# Patient Record
Sex: FEMALE | Race: WHITE | HISPANIC OR LATINO | Employment: OTHER | URBAN - METROPOLITAN AREA
[De-identification: names, ages, dates, MRNs, and addresses within clinical notes are randomized per-mention and may not be internally consistent; named-entity substitution may affect disease eponyms.]

---

## 2018-07-09 ENCOUNTER — OFFICE VISIT (OUTPATIENT)
Dept: INTERNAL MEDICINE | Facility: CLINIC | Age: 78
End: 2018-07-09

## 2018-07-09 ENCOUNTER — LAB VISIT (OUTPATIENT)
Dept: LAB | Facility: HOSPITAL | Age: 78
End: 2018-07-09
Attending: INTERNAL MEDICINE

## 2018-07-09 ENCOUNTER — TELEPHONE (OUTPATIENT)
Dept: INTERNAL MEDICINE | Facility: CLINIC | Age: 78
End: 2018-07-09

## 2018-07-09 ENCOUNTER — HOSPITAL ENCOUNTER (OUTPATIENT)
Dept: CARDIOLOGY | Facility: CLINIC | Age: 78
Discharge: HOME | End: 2018-07-09

## 2018-07-09 DIAGNOSIS — E03.9 HYPOTHYROIDISM, UNSPECIFIED TYPE: ICD-10-CM

## 2018-07-09 DIAGNOSIS — I10 HYPERTENSION, UNSPECIFIED TYPE: ICD-10-CM

## 2018-07-09 DIAGNOSIS — R53.83 FATIGUE, UNSPECIFIED TYPE: ICD-10-CM

## 2018-07-09 DIAGNOSIS — Z00.00 ROUTINE GENERAL MEDICAL EXAMINATION AT A HEALTH CARE FACILITY: Primary | ICD-10-CM

## 2018-07-09 DIAGNOSIS — Z00.00 ROUTINE GENERAL MEDICAL EXAMINATION AT A HEALTH CARE FACILITY: ICD-10-CM

## 2018-07-09 DIAGNOSIS — Z00.00 GENERAL MEDICAL EXAM: Primary | ICD-10-CM

## 2018-07-09 LAB
25(OH)D3+25(OH)D2 SERPL-MCNC: 38 NG/ML
ALBUMIN SERPL BCP-MCNC: 4.1 G/DL
ALP SERPL-CCNC: 147 U/L
ALT SERPL W/O P-5'-P-CCNC: 19 U/L
ANION GAP SERPL CALC-SCNC: 8 MMOL/L
AST SERPL-CCNC: 25 U/L
BASOPHILS # BLD AUTO: 0.03 K/UL
BASOPHILS NFR BLD: 0.6 %
BILIRUB SERPL-MCNC: 0.6 MG/DL
BUN SERPL-MCNC: 23 MG/DL
CALCIUM SERPL-MCNC: 10.1 MG/DL
CHLORIDE SERPL-SCNC: 102 MMOL/L
CHOLEST SERPL-MCNC: 217 MG/DL
CHOLEST/HDLC SERPL: 4.3 {RATIO}
CO2 SERPL-SCNC: 29 MMOL/L
CREAT SERPL-MCNC: 1.2 MG/DL
DIFFERENTIAL METHOD: ABNORMAL
EOSINOPHIL # BLD AUTO: 0.2 K/UL
EOSINOPHIL NFR BLD: 4 %
ERYTHROCYTE [DISTWIDTH] IN BLOOD BY AUTOMATED COUNT: 14.6 %
ERYTHROCYTE [SEDIMENTATION RATE] IN BLOOD BY WESTERGREN METHOD: 20 MM/HR
EST. GFR  (AFRICAN AMERICAN): 50.4 ML/MIN/1.73 M^2
EST. GFR  (NON AFRICAN AMERICAN): 43.7 ML/MIN/1.73 M^2
ESTIMATED AVG GLUCOSE: 117 MG/DL
GLUCOSE SERPL-MCNC: 103 MG/DL
HBA1C MFR BLD HPLC: 5.7 %
HCT VFR BLD AUTO: 41.1 %
HDLC SERPL-MCNC: 51 MG/DL
HDLC SERPL: 23.5 %
HGB BLD-MCNC: 13.1 G/DL
IMM GRANULOCYTES # BLD AUTO: 0.01 K/UL
IMM GRANULOCYTES NFR BLD AUTO: 0.2 %
LDLC SERPL CALC-MCNC: 141.8 MG/DL
LYMPHOCYTES # BLD AUTO: 1.4 K/UL
LYMPHOCYTES NFR BLD: 28.9 %
MCH RBC QN AUTO: 26.5 PG
MCHC RBC AUTO-ENTMCNC: 31.9 G/DL
MCV RBC AUTO: 83 FL
MONOCYTES # BLD AUTO: 0.4 K/UL
MONOCYTES NFR BLD: 8.3 %
NEUTROPHILS # BLD AUTO: 2.8 K/UL
NEUTROPHILS NFR BLD: 58 %
NONHDLC SERPL-MCNC: 166 MG/DL
NRBC BLD-RTO: 0 /100 WBC
PLATELET # BLD AUTO: 412 K/UL
PMV BLD AUTO: 9.4 FL
POTASSIUM SERPL-SCNC: 3.8 MMOL/L
PROT SERPL-MCNC: 8.1 G/DL
RBC # BLD AUTO: 4.95 M/UL
SODIUM SERPL-SCNC: 139 MMOL/L
T4 FREE SERPL-MCNC: 1.24 NG/DL
TRIGL SERPL-MCNC: 121 MG/DL
TSH SERPL DL<=0.005 MIU/L-ACNC: 0.13 UIU/ML
WBC # BLD AUTO: 4.81 K/UL

## 2018-07-09 PROCEDURE — 80061 LIPID PANEL: CPT

## 2018-07-09 PROCEDURE — 80053 COMPREHEN METABOLIC PANEL: CPT

## 2018-07-09 PROCEDURE — 84443 ASSAY THYROID STIM HORMONE: CPT

## 2018-07-09 PROCEDURE — 99999 PR PBB SHADOW E&M-NEW PATIENT-LVL III: CPT | Mod: PBBFAC,,, | Performed by: INTERNAL MEDICINE

## 2018-07-09 PROCEDURE — 85025 COMPLETE CBC W/AUTO DIFF WBC: CPT

## 2018-07-09 PROCEDURE — 84439 ASSAY OF FREE THYROXINE: CPT

## 2018-07-09 PROCEDURE — 36415 COLL VENOUS BLD VENIPUNCTURE: CPT

## 2018-07-09 PROCEDURE — 83036 HEMOGLOBIN GLYCOSYLATED A1C: CPT

## 2018-07-09 PROCEDURE — 93010 ELECTROCARDIOGRAM REPORT: CPT | Mod: S$PBB,,, | Performed by: INTERNAL MEDICINE

## 2018-07-09 PROCEDURE — 82306 VITAMIN D 25 HYDROXY: CPT

## 2018-07-09 PROCEDURE — 99203 OFFICE O/P NEW LOW 30 MIN: CPT | Mod: PBBFAC | Performed by: INTERNAL MEDICINE

## 2018-07-09 PROCEDURE — 93005 ELECTROCARDIOGRAM TRACING: CPT | Mod: PBBFAC | Performed by: INTERNAL MEDICINE

## 2018-07-09 PROCEDURE — 85651 RBC SED RATE NONAUTOMATED: CPT

## 2018-07-09 PROCEDURE — 99214 OFFICE O/P EST MOD 30 MIN: CPT | Mod: S$PBB,,, | Performed by: INTERNAL MEDICINE

## 2018-07-10 VITALS
DIASTOLIC BLOOD PRESSURE: 78 MMHG | BODY MASS INDEX: 27.16 KG/M2 | HEART RATE: 56 BPM | WEIGHT: 148.5 LBS | SYSTOLIC BLOOD PRESSURE: 154 MMHG

## 2018-07-10 NOTE — PROGRESS NOTES
Subjective:       Patient ID: Isiah Allen is a 78 y.o. female.    Chief Complaint: Annual Exam    HPIPleasant lady from [place] here for her annual exam. Overall, doing well but has been experiencing issues with lighheadedness, dizziness over the last 6 months. This mainly occurs in the morning hours when she awakes and rises from her rest. She describes sx's c/w orthostatics as these resolve once she is upright and ambulating. Further questioning revealed she was started on Carbimem by her local MD for her BP. This is a potent CCB available in [place] and [place]. She is on high dose Approvel - a combo of ibersartan and HCTZ which she had been on before the addition of the Carbimem, Mrs Allen stopped taking this med in the morning 2 days nago and has noted some improvement. I will monitor he BP these days and will adjust her meds as indicated. I will hold and possibly change this med and see how she does. Otherwise doing well.  Review of Systems   Constitutional: Negative for activity change, appetite change, fatigue and unexpected weight change.   HENT: Negative.    Eyes: Negative for visual disturbance.   Respiratory: Negative for cough, shortness of breath and wheezing.    Cardiovascular: Negative for chest pain, palpitations and leg swelling.   Gastrointestinal: Negative for abdominal distention, abdominal pain and blood in stool.   Genitourinary: Negative for difficulty urinating.   Musculoskeletal: Negative for arthralgias, back pain and joint swelling.   Neurological: Positive for light-headedness, numbness and headaches. Negative for dizziness, tremors, syncope, facial asymmetry and weakness.   Hematological: Negative.        Objective:      Physical Exam   Constitutional: She is oriented to person, place, and time. She appears well-developed and well-nourished. No distress.   HENT:   Head: Normocephalic and atraumatic.   Right Ear: External ear normal.   Left Ear: External ear normal.    Mouth/Throat: Oropharynx is clear and moist. No oropharyngeal exudate.   Eyes: Conjunctivae and EOM are normal. Pupils are equal, round, and reactive to light. Right eye exhibits no discharge. Left eye exhibits no discharge.   Neck: Normal range of motion. Neck supple. No JVD present. No thyromegaly present.   Cardiovascular: Normal rate, regular rhythm, normal heart sounds and intact distal pulses.    No murmur heard.  Pulmonary/Chest: Effort normal and breath sounds normal. No respiratory distress. She has no wheezes. She exhibits no tenderness.   Abdominal: Soft. Bowel sounds are normal. She exhibits no distension and no mass. There is no tenderness.   Musculoskeletal: Normal range of motion. She exhibits no edema.   Lymphadenopathy:     She has no cervical adenopathy.   Neurological: She is alert and oriented to person, place, and time. No cranial nerve deficit. Coordination normal.   Skin: Skin is warm and dry. No rash noted. She is not diaphoretic. No erythema.   Psychiatric: She has a normal mood and affect. Her behavior is normal. Judgment and thought content normal.   Nursing note and vitals reviewed.      Assessment:       1. Routine general medical examination at a health care facility    2. Hypertension, unspecified type    3. Hypothyroidism, unspecified type    4. Fatigue, unspecified type     5.     Subjective lighheadedness - likely orthostatic - improved as per pt.   Plan:    1. Obtain blood work.         2. CXR/EKG.         3. Hold carbimem and continue with  Approvel.         4. Monitor BP and adjust meds accordingly.         5. RTC for review.

## 2018-07-11 ENCOUNTER — LAB VISIT (OUTPATIENT)
Dept: LAB | Facility: HOSPITAL | Age: 78
End: 2018-07-11
Attending: INTERNAL MEDICINE

## 2018-07-11 DIAGNOSIS — Z00.00 GENERAL MEDICAL EXAM: ICD-10-CM

## 2018-07-11 LAB — OB PNL STL: NEGATIVE

## 2018-07-11 PROCEDURE — 82272 OCCULT BLD FECES 1-3 TESTS: CPT

## 2018-07-12 ENCOUNTER — OFFICE VISIT (OUTPATIENT)
Dept: CARDIOLOGY | Facility: CLINIC | Age: 78
End: 2018-07-12

## 2018-07-12 ENCOUNTER — OFFICE VISIT (OUTPATIENT)
Dept: INTERNAL MEDICINE | Facility: CLINIC | Age: 78
End: 2018-07-12

## 2018-07-12 VITALS — DIASTOLIC BLOOD PRESSURE: 74 MMHG | HEART RATE: 50 BPM | SYSTOLIC BLOOD PRESSURE: 174 MMHG

## 2018-07-12 VITALS
OXYGEN SATURATION: 98 % | HEART RATE: 48 BPM | BODY MASS INDEX: 29.86 KG/M2 | SYSTOLIC BLOOD PRESSURE: 148 MMHG | WEIGHT: 152.13 LBS | DIASTOLIC BLOOD PRESSURE: 65 MMHG | HEIGHT: 60 IN

## 2018-07-12 DIAGNOSIS — I10 HYPERTENSION, UNSPECIFIED TYPE: ICD-10-CM

## 2018-07-12 DIAGNOSIS — R00.1 SYMPTOMATIC BRADYCARDIA: Primary | ICD-10-CM

## 2018-07-12 DIAGNOSIS — I10 ESSENTIAL HYPERTENSION: ICD-10-CM

## 2018-07-12 DIAGNOSIS — R01.1 CARDIAC MURMUR: ICD-10-CM

## 2018-07-12 DIAGNOSIS — R00.1 BRADYCARDIA: ICD-10-CM

## 2018-07-12 DIAGNOSIS — R42 DIZZINESS: ICD-10-CM

## 2018-07-12 DIAGNOSIS — Z71.89 ENCOUNTER FOR MEDICATION REVIEW AND COUNSELING: Primary | ICD-10-CM

## 2018-07-12 PROCEDURE — 99999 PR PBB SHADOW E&M-EST. PATIENT-LVL III: CPT | Mod: PBBFAC,,, | Performed by: INTERNAL MEDICINE

## 2018-07-12 PROCEDURE — 99213 OFFICE O/P EST LOW 20 MIN: CPT | Mod: S$PBB,,, | Performed by: INTERNAL MEDICINE

## 2018-07-12 PROCEDURE — 99213 OFFICE O/P EST LOW 20 MIN: CPT | Mod: PBBFAC | Performed by: INTERNAL MEDICINE

## 2018-07-12 PROCEDURE — 99213 OFFICE O/P EST LOW 20 MIN: CPT | Mod: PBBFAC,27 | Performed by: INTERNAL MEDICINE

## 2018-07-12 PROCEDURE — 99204 OFFICE O/P NEW MOD 45 MIN: CPT | Mod: S$PBB,,, | Performed by: INTERNAL MEDICINE

## 2018-07-12 RX ORDER — HYDRALAZINE HYDROCHLORIDE 10 MG/1
10 TABLET, FILM COATED ORAL 3 TIMES DAILY
Qty: 90 TABLET | Refills: 11 | Status: SHIPPED | OUTPATIENT
Start: 2018-07-12 | End: 2019-07-12

## 2018-07-12 NOTE — PROGRESS NOTES
Cardiology Clinic Note  Reason for Visit: Hypertension; Fatigue; Shortness of Breath; and Dizziness    HPI:   Ms. Isiah Allen is a 78 y.o. 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 She has been on approvel(Irbesartan and HCTZ) and Cardimem(smetdybbcieewx-TP-MCG).Cardimem was discontinued by Dr Christensen as possible culprit for her symptoms. She denies any chest pain-does report some SOB with exertion.Denies any leg swelling.    ROS:    Review of Systems   All other systems reviewed and are negative.    PMH:     Past Medical History:   Diagnosis Date    Anxiety     Bell's palsy 2010    GERD (gastroesophageal reflux disease) 6/24/2013    Heart murmur     Hypertension     Mixed hyperlipidemia 6/24/2013    Thyroid disease      Past Surgical History:   Procedure Laterality Date    ESOPHAGEAL DILATION  6/18/13    HYSTERECTOMY  1980    dub    TONSILLECTOMY       Allergies:   Review of patient's allergies indicates:  No Known Allergies  Medications:     Current Outpatient Prescriptions on File Prior to Visit   Medication Sig Dispense Refill    cholecalciferol, vitamin D3, 50,000 unit Tab Take 1 tablet by mouth every 7 days. 12 tablet 0    levothyroxine (SYNTHROID) 50 MCG tablet Take 1 tablet (50 mcg total) by mouth once daily. (Patient taking differently: Take 75 mcg by mouth once daily. ) 60 tablet 6    furosemide (LASIX) 20 MG tablet Take 10 mg by mouth once daily.      [DISCONTINUED] cyclobenzaprine  (FLEXERIL) 5 MG tablet Take 1 tablet (5 mg total) by mouth nightly. 30 tablet 0    [DISCONTINUED] losartan-hydrochlorothiazide 100-12.5 mg (HYZAAR) 100-12.5 mg Tab Take 1 tablet by mouth once daily. 30 tablet 11     Current Facility-Administered Medications on File Prior to Visit   Medication Dose Route Frequency Provider Last Rate Last Dose    triamcinolone acetonide injection 40 mg  40 mg Intra-articular Once Sanjay Burt,          Social History:     Social History   Substance Use Topics    Smoking status: Never Smoker    Smokeless tobacco: Never Used    Alcohol use Yes      Comment: socially     Family History:     Family History   Problem Relation Age of Onset    Cancer Mother         breast    Cancer Son         skin, throat     Physical Exam:   BP (!) 148/65 (BP Location: Left arm, Patient Position: Sitting, BP Method: Large (Automatic))   Pulse (!) 48   Ht 5' (1.524 m)   Wt 69 kg (152 lb 1.9 oz)   SpO2 98%   BMI 29.71 kg/m²      Physical Exam  General: alert, awake and oriented x 3  Eyes:PERRL.   Neck:no JVD   Lungs:  clear to auscultation bilaterally   Cardiovascular: Heart: regular rate and rhythm, S1, S2 normal, NAHEED 2/6 best at RUSB radiation to carotid-AS likely  Chest Wall: no tenderness.   Pulses-2+ radial,DP, PT b/l  Extremities: no cyanosis or edema.   Abdomen/Rectal: Abdomen: soft, non-tender non-distented; bowel sounds normal  Neurologic: Normal strength and tone. No focal numbness or weakness  Labs:     Lab Results   Component Value Date     07/09/2018    K 3.8 07/09/2018     07/09/2018    CO2 29 07/09/2018    BUN 23 07/09/2018    CREATININE 1.2 07/09/2018    ANIONGAP 8 07/09/2018     Lab Results   Component Value Date    HGBA1C 5.7 (H) 07/09/2018     No results found for: BNP, BNPTRIAGEBLO Lab Results   Component Value Date    WBC 4.81 07/09/2018    HGB 13.1 07/09/2018    HCT 41.1 07/09/2018     (H) 07/09/2018    GRAN 2.8 07/09/2018    GRAN 58.0 07/09/2018     Lab  Results   Component Value Date    CHOL 217 (H) 07/09/2018    HDL 51 07/09/2018    LDLCALC 141.8 07/09/2018    TRIG 121 07/09/2018          Imaging:     EF   Date Value Ref Range Status   06/21/2013 65         EKG: Sinus bradycardia with no ischemic changes  Assessment:     1. Symptomatic bradycardia    2. Hypertension, unspecified type    3. Cardiac murmur        Plan:   Symptomatic bradycardia  -     2D Echo w/ Color Flow Doppler; Future  -     Holter monitor - 24 hour; Future  -     Cardiac treadmill stress test; Future  -    Get report of holter and echo from  clinic        -    Her HR did not improve from 46 on exertion in clinic        -    Not orthostatic-HR stayed in 40s on standing        -    Will start Hydralazine for BP control without compromising heart rate    Discussed with Dr. Clifton. RTC in 1 month.         RAMOS MADDOX  CARDIOLOGY FELLOW, PGY 4  [phone]

## 2018-07-12 NOTE — PROGRESS NOTES
I have personally interviewed and examined the patient. I have reviewed the notes, assessments and plans performed by Dr Boothe and I CONCUR with his documentation of Isiah Allen.

## 2018-07-13 ENCOUNTER — HOSPITAL ENCOUNTER (OUTPATIENT)
Dept: VASCULAR SURGERY | Facility: CLINIC | Age: 78
Discharge: HOME | End: 2018-07-13
Attending: INTERNAL MEDICINE

## 2018-07-13 ENCOUNTER — HOSPITAL ENCOUNTER (OUTPATIENT)
Dept: CARDIOLOGY | Facility: CLINIC | Age: 78
Discharge: HOME | End: 2018-07-13
Attending: INTERNAL MEDICINE

## 2018-07-13 DIAGNOSIS — R00.1 SYMPTOMATIC BRADYCARDIA: ICD-10-CM

## 2018-07-13 DIAGNOSIS — R42 DIZZINESS: ICD-10-CM

## 2018-07-13 LAB
DIASTOLIC DYSFUNCTION: NO
DIASTOLIC DYSFUNCTION: YES
ESTIMATED PA SYSTOLIC PRESSURE: 38.05
MITRAL VALVE MOBILITY: NORMAL
RETIRED EF AND QEF - SEE NOTES: 60 (ref 55–65)

## 2018-07-13 PROCEDURE — 93017 CV STRESS TEST TRACING ONLY: CPT | Mod: PBBFAC | Performed by: INTERNAL MEDICINE

## 2018-07-13 PROCEDURE — 93016 CV STRESS TEST SUPVJ ONLY: CPT | Mod: S$PBB,,, | Performed by: INTERNAL MEDICINE

## 2018-07-13 PROCEDURE — 93880 EXTRACRANIAL BILAT STUDY: CPT | Mod: 26,S$PBB,, | Performed by: SURGERY

## 2018-07-13 PROCEDURE — 93018 CV STRESS TEST I&R ONLY: CPT | Mod: S$PBB,,, | Performed by: INTERNAL MEDICINE

## 2018-07-13 PROCEDURE — 93880 EXTRACRANIAL BILAT STUDY: CPT | Mod: PBBFAC | Performed by: SURGERY

## 2018-07-13 PROCEDURE — 93306 TTE W/DOPPLER COMPLETE: CPT | Mod: PBBFAC | Performed by: INTERNAL MEDICINE

## 2018-07-13 NOTE — PROGRESS NOTES
Ms Allen is here today for her review. I gave her copies of her studies and discussed them in detail including blood work that was basically unremarkable. Platelets were again increased to 412 - chronic, cholesterol at 217 with acceptable HDL fraction and TSH decreased to 0.126 with normal free T4 at 1.24. She is on Synthroid 75 mcgs daily. I will monitor TSH level and consider adjusting dosage after she has completed her evaluation with us. All other parameters were unremarkable. EKG was normal.  On exam today, she continues to experience fatigue and lightheadedness although improved. Her BP today was elevated and will make arrangements for her to be seen in Cardiology given her sx's. Her bradycardia may be contributing to this and will be addressed by Cardiology. I will see her back once she has completed her exams.

## 2018-09-10 ENCOUNTER — TELEPHONE (OUTPATIENT)
Dept: CARDIOLOGY | Facility: CLINIC | Age: 78
End: 2018-09-10

## 2018-09-10 NOTE — TELEPHONE ENCOUNTER
Called and discussed test results with patients.Patient is in [place] and had to leave [place] after Tests done due to family emergency and did not make it to the follow up appointment. 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 Relayed this to Patient's son Dr Guerra and recommended getting dual chamber PPM if not here then in [place].          ----- Message from Tyra Roach MA sent at 9/7/2018  2:03 PM CDT -----  Contact: Arpita ext. [phone] cell [phone]  with [facility]      ----- Message -----  From: Latricia Molina RN  Sent: 9/7/2018   1:53 PM  To: Shyann Bates Staff    Tyra, please see the message. Latricia Underwood    ----- Message -----  From: Mirna Iglesias  Sent: 9/7/2018   1:28 PM  To: Latricia Molina, RAMONE    Pt called wanting her test results for her carotid ultrasound, treadmill and echo. Arpita says if she can be called with the results she can call and translate them to the pt.  LOV 7/12/18 Dr. Boothe    Thanks

## 2019-04-22 ENCOUNTER — OFFICE VISIT (OUTPATIENT)
Dept: INTERNAL MEDICINE | Facility: CLINIC | Age: 79
End: 2019-04-22
Payer: COMMERCIAL

## 2019-04-22 VITALS
WEIGHT: 151.88 LBS | DIASTOLIC BLOOD PRESSURE: 78 MMHG | BODY MASS INDEX: 29.67 KG/M2 | SYSTOLIC BLOOD PRESSURE: 152 MMHG | HEART RATE: 60 BPM

## 2019-04-22 DIAGNOSIS — K63.5 POLYP OF COLON, UNSPECIFIED PART OF COLON, UNSPECIFIED TYPE: ICD-10-CM

## 2019-04-22 DIAGNOSIS — E03.9 HYPOTHYROIDISM, UNSPECIFIED TYPE: ICD-10-CM

## 2019-04-22 DIAGNOSIS — E78.5 HYPERLIPIDEMIA, UNSPECIFIED HYPERLIPIDEMIA TYPE: ICD-10-CM

## 2019-04-22 DIAGNOSIS — E55.9 VITAMIN D DEFICIENCY: ICD-10-CM

## 2019-04-22 DIAGNOSIS — K21.00 GASTROESOPHAGEAL REFLUX DISEASE WITH ESOPHAGITIS: ICD-10-CM

## 2019-04-22 DIAGNOSIS — I10 ESSENTIAL HYPERTENSION: Primary | ICD-10-CM

## 2019-04-22 DIAGNOSIS — K29.70 GASTRITIS, PRESENCE OF BLEEDING UNSPECIFIED, UNSPECIFIED CHRONICITY, UNSPECIFIED GASTRITIS TYPE: ICD-10-CM

## 2019-04-22 PROCEDURE — 1101F PT FALLS ASSESS-DOCD LE1/YR: CPT | Mod: CPTII,S$GLB,, | Performed by: INTERNAL MEDICINE

## 2019-04-22 PROCEDURE — 3078F PR MOST RECENT DIASTOLIC BLOOD PRESSURE < 80 MM HG: ICD-10-PCS | Mod: CPTII,S$GLB,, | Performed by: INTERNAL MEDICINE

## 2019-04-22 PROCEDURE — 99214 OFFICE O/P EST MOD 30 MIN: CPT | Mod: S$GLB,,, | Performed by: INTERNAL MEDICINE

## 2019-04-22 PROCEDURE — 3078F DIAST BP <80 MM HG: CPT | Mod: CPTII,S$GLB,, | Performed by: INTERNAL MEDICINE

## 2019-04-22 PROCEDURE — 99999 PR PBB SHADOW E&M-EST. PATIENT-LVL III: ICD-10-PCS | Mod: PBBFAC,,, | Performed by: INTERNAL MEDICINE

## 2019-04-22 PROCEDURE — 1101F PR PT FALLS ASSESS DOC 0-1 FALLS W/OUT INJ PAST YR: ICD-10-PCS | Mod: CPTII,S$GLB,, | Performed by: INTERNAL MEDICINE

## 2019-04-22 PROCEDURE — 3077F SYST BP >= 140 MM HG: CPT | Mod: CPTII,S$GLB,, | Performed by: INTERNAL MEDICINE

## 2019-04-22 PROCEDURE — 3077F PR MOST RECENT SYSTOLIC BLOOD PRESSURE >= 140 MM HG: ICD-10-PCS | Mod: CPTII,S$GLB,, | Performed by: INTERNAL MEDICINE

## 2019-04-22 PROCEDURE — 99214 PR OFFICE/OUTPT VISIT, EST, LEVL IV, 30-39 MIN: ICD-10-PCS | Mod: S$GLB,,, | Performed by: INTERNAL MEDICINE

## 2019-04-22 PROCEDURE — 99999 PR PBB SHADOW E&M-EST. PATIENT-LVL III: CPT | Mod: PBBFAC,,, | Performed by: INTERNAL MEDICINE

## 2019-04-22 RX ORDER — AMLODIPINE BESYLATE 5 MG/1
5 TABLET ORAL DAILY
COMMUNITY

## 2019-04-22 NOTE — PROGRESS NOTES
Subjective:       Patient ID: Isiah Allen is a 79 y.o. female.    Chief Complaint: Hypertension and Gastroesophageal Reflux    HPI Pleasant lady from [place] here for follow up HTN andother health issues. We saw her last year for HTN and other cardiac issues she was having at the time. She had experienced a syncopal episode prior to our visit prompting her visit with us. She underwent several cardiac studies, but had to depart prematurely 2o to an emergent situation in her country. She has been seen by her local Cardiologist who made some adjustments to her med, specifically he stopped the hydralazine and switched her to amlodipine and Co-Approvel - a combination of HCTZ and ARB. She has done well with this combination and has had no further syncopal episodes. During her last visit here, it was recommended she consider a PM placement, but she did not return. She recently had GI studies including EGD and colonoscopy that showed evidence of esophagitis, erosive gastritis, large hiatal hernia. It was recommended she undergo surgery to address the HH, but she declined until she hears our recommendations. She was treated with 2 week course of PPI, and still notes some reflux, pyrosis and some dysphagia,. I will repeat EGD for evaluation and treat accordingly. She also underwent a colonoscopy for the first time that showed a pedunculated, large polyp. This was removed, bu I don't have the Pathology report regarding this ominous-looking lesion. I placed a call to her physician and will discuss the path findings with him before I proceed.  Review of Systems   Constitutional: Negative for activity change, appetite change, fatigue and unexpected weight change.   Cardiovascular: Negative for chest pain, palpitations and leg swelling.   Gastrointestinal: Negative for abdominal distention and blood in stool.        As per HPI.   Hematological: Negative.        Objective:      Physical Exam   Constitutional: She appears  well-developed and well-nourished. No distress.   HENT:   Head: Normocephalic and atraumatic.   Right Ear: External ear normal.   Left Ear: External ear normal.   Mouth/Throat: Oropharynx is clear and moist. No oropharyngeal exudate.   Eyes: Pupils are equal, round, and reactive to light. Conjunctivae and EOM are normal. Right eye exhibits no discharge. Left eye exhibits no discharge.   Neck: Normal range of motion. Neck supple. No JVD present. No thyromegaly present.   Cardiovascular: Normal rate, regular rhythm and intact distal pulses.   Murmur heard.  II/VI NAHEED at [facility].   Pulmonary/Chest: Effort normal and breath sounds normal. No respiratory distress. She has no wheezes. She exhibits no tenderness.   Abdominal: Soft. Bowel sounds are normal. She exhibits no distension and no mass. There is no tenderness.   Musculoskeletal: Normal range of motion. She exhibits no edema or tenderness.   Skin: Skin is warm and dry. She is not diaphoretic.   Psychiatric: She has a normal mood and affect. Her behavior is normal. Judgment and thought content normal.   Nursing note and vitals reviewed.      Assessment:       1. Essential hypertension    2. Gastroesophageal reflux disease with esophagitis    3. Gastritis, presence of bleeding unspecified, unspecified chronicity, unspecified gastritis type    4. Polyp of colon, unspecified part of colon, unspecified type    5. Hypothyroidism, unspecified type    6. Vitamin D deficiency    7. Hyperlipidemia, unspecified hyperlipidemia type        Plan:    1. Obtain labs.         2. Stool for OCB and H pylori.         3. EGD.         4. Follow up with Cardiology.         5. RTC for review.

## 2019-04-22 NOTE — H&P (VIEW-ONLY)
Subjective:       Patient ID: Isiah Allen is a 79 y.o. female.    Chief Complaint: Hypertension and Gastroesophageal Reflux    HPI Pleasant lady from [place] here for follow up HTN andother health issues. We saw her last year for HTN and other cardiac issues she was having at the time. She had experienced a syncopal episode prior to our visit prompting her visit with us. She underwent several cardiac studies, but had to depart prematurely 2o to an emergent situation in [place]. She has been seen by her local Cardiologist who made some adjustments to her med, specifically he stopped the hydralazine and switched her to amlodipine and Co-Approvel - a combination of HCTZ and ARB. She has done well with this combination and has had no further syncopal episodes. During her last visit here, it was recommended she consider a PM placement, but she did not return. She recently had GI studies including EGD and colonoscopy that showed evidence of esophagitis, erosive gastritis, large hiatal hernia. It was recommended she undergo surgery to address the HH, but she declined until she hears our recommendations. She was treated with 2 week course of PPI, and still notes some reflux, pyrosis and some dysphagia,. I will repeat EGD for evaluation and treat accordingly. She also underwent a colonoscopy for the first time that showed a pedunculated, large polyp. This was removed, bu I don't have the Pathology report regarding this ominous-looking lesion. I placed a call to her physician and will discuss the path findings with him before I proceed.  Review of Systems   Constitutional: Negative for activity change, appetite change, fatigue and unexpected weight change.   Cardiovascular: Negative for chest pain, palpitations and leg swelling.   Gastrointestinal: Negative for abdominal distention and blood in stool.        As per HPI.   Hematological: Negative.        Objective:      Physical Exam   Constitutional: She appears  well-developed and well-nourished. No distress.   HENT:   Head: Normocephalic and atraumatic.   Right Ear: External ear normal.   Left Ear: External ear normal.   Mouth/Throat: Oropharynx is clear and moist. No oropharyngeal exudate.   Eyes: Pupils are equal, round, and reactive to light. Conjunctivae and EOM are normal. Right eye exhibits no discharge. Left eye exhibits no discharge.   Neck: Normal range of motion. Neck supple. No JVD present. No thyromegaly present.   Cardiovascular: Normal rate, regular rhythm and intact distal pulses.   Murmur heard.  II/VI NAHEED at [facility].   Pulmonary/Chest: Effort normal and breath sounds normal. No respiratory distress. She has no wheezes. She exhibits no tenderness.   Abdominal: Soft. Bowel sounds are normal. She exhibits no distension and no mass. There is no tenderness.   Musculoskeletal: Normal range of motion. She exhibits no edema or tenderness.   Skin: Skin is warm and dry. She is not diaphoretic.   Psychiatric: She has a normal mood and affect. Her behavior is normal. Judgment and thought content normal.   Nursing note and vitals reviewed.      Assessment:       1. Essential hypertension    2. Gastroesophageal reflux disease with esophagitis    3. Gastritis, presence of bleeding unspecified, unspecified chronicity, unspecified gastritis type    4. Polyp of colon, unspecified part of colon, unspecified type    5. Hypothyroidism, unspecified type    6. Vitamin D deficiency    7. Hyperlipidemia, unspecified hyperlipidemia type        Plan:    1. Obtain labs.         2. Stool for OCB and H pylori.         3. EGD.         4. Follow up with Cardiology.         5. RTC for review.

## 2019-04-23 ENCOUNTER — LAB VISIT (OUTPATIENT)
Dept: LAB | Facility: HOSPITAL | Age: 79
End: 2019-04-23
Attending: INTERNAL MEDICINE
Payer: COMMERCIAL

## 2019-04-23 DIAGNOSIS — E78.5 HYPERLIPIDEMIA, UNSPECIFIED HYPERLIPIDEMIA TYPE: ICD-10-CM

## 2019-04-23 DIAGNOSIS — E03.9 HYPOTHYROIDISM, UNSPECIFIED TYPE: ICD-10-CM

## 2019-04-23 DIAGNOSIS — E55.9 VITAMIN D DEFICIENCY: ICD-10-CM

## 2019-04-23 DIAGNOSIS — I10 ESSENTIAL HYPERTENSION: ICD-10-CM

## 2019-04-23 LAB
25(OH)D3+25(OH)D2 SERPL-MCNC: 73 NG/ML (ref 30–96)
ALBUMIN SERPL BCP-MCNC: 3.7 G/DL (ref 3.5–5.2)
ALP SERPL-CCNC: 130 U/L (ref 55–135)
ALT SERPL W/O P-5'-P-CCNC: 13 U/L (ref 10–44)
ANION GAP SERPL CALC-SCNC: 9 MMOL/L (ref 8–16)
AST SERPL-CCNC: 21 U/L (ref 10–40)
BASOPHILS # BLD AUTO: 0.04 K/UL (ref 0–0.2)
BASOPHILS NFR BLD: 0.9 % (ref 0–1.9)
BILIRUB SERPL-MCNC: 0.6 MG/DL (ref 0.1–1)
BUN SERPL-MCNC: 20 MG/DL (ref 8–23)
CALCIUM SERPL-MCNC: 10.3 MG/DL (ref 8.7–10.5)
CHLORIDE SERPL-SCNC: 99 MMOL/L (ref 95–110)
CHOLEST SERPL-MCNC: 239 MG/DL (ref 120–199)
CHOLEST/HDLC SERPL: 5.2 {RATIO} (ref 2–5)
CO2 SERPL-SCNC: 30 MMOL/L (ref 23–29)
CREAT SERPL-MCNC: 1.1 MG/DL (ref 0.5–1.4)
DIFFERENTIAL METHOD: ABNORMAL
EOSINOPHIL # BLD AUTO: 0.2 K/UL (ref 0–0.5)
EOSINOPHIL NFR BLD: 4.2 % (ref 0–8)
ERYTHROCYTE [DISTWIDTH] IN BLOOD BY AUTOMATED COUNT: 14.6 % (ref 11.5–14.5)
EST. GFR  (AFRICAN AMERICAN): 55.6 ML/MIN/1.73 M^2
EST. GFR  (NON AFRICAN AMERICAN): 48.2 ML/MIN/1.73 M^2
GLUCOSE SERPL-MCNC: 98 MG/DL (ref 70–110)
HCT VFR BLD AUTO: 39 % (ref 37–48.5)
HDLC SERPL-MCNC: 46 MG/DL (ref 40–75)
HDLC SERPL: 19.2 % (ref 20–50)
HGB BLD-MCNC: 12.5 G/DL (ref 12–16)
IMM GRANULOCYTES # BLD AUTO: 0.01 K/UL (ref 0–0.04)
IMM GRANULOCYTES NFR BLD AUTO: 0.2 % (ref 0–0.5)
LDLC SERPL CALC-MCNC: 169.6 MG/DL (ref 63–159)
LYMPHOCYTES # BLD AUTO: 1.4 K/UL (ref 1–4.8)
LYMPHOCYTES NFR BLD: 32.6 % (ref 18–48)
MCH RBC QN AUTO: 26.5 PG (ref 27–31)
MCHC RBC AUTO-ENTMCNC: 32.1 G/DL (ref 32–36)
MCV RBC AUTO: 83 FL (ref 82–98)
MONOCYTES # BLD AUTO: 0.4 K/UL (ref 0.3–1)
MONOCYTES NFR BLD: 9 % (ref 4–15)
NEUTROPHILS # BLD AUTO: 2.3 K/UL (ref 1.8–7.7)
NEUTROPHILS NFR BLD: 53.1 % (ref 38–73)
NONHDLC SERPL-MCNC: 193 MG/DL
NRBC BLD-RTO: 0 /100 WBC
PLATELET # BLD AUTO: 416 K/UL (ref 150–350)
PMV BLD AUTO: 9.7 FL (ref 9.2–12.9)
POTASSIUM SERPL-SCNC: 3.8 MMOL/L (ref 3.5–5.1)
PROT SERPL-MCNC: 7.9 G/DL (ref 6–8.4)
RBC # BLD AUTO: 4.71 M/UL (ref 4–5.4)
SODIUM SERPL-SCNC: 138 MMOL/L (ref 136–145)
T4 FREE SERPL-MCNC: 0.92 NG/DL (ref 0.71–1.51)
TRIGL SERPL-MCNC: 117 MG/DL (ref 30–150)
TSH SERPL DL<=0.005 MIU/L-ACNC: 4.27 UIU/ML (ref 0.4–4)
WBC # BLD AUTO: 4.33 K/UL (ref 3.9–12.7)

## 2019-04-23 PROCEDURE — 84443 ASSAY THYROID STIM HORMONE: CPT

## 2019-04-23 PROCEDURE — 84439 ASSAY OF FREE THYROXINE: CPT

## 2019-04-23 PROCEDURE — 85025 COMPLETE CBC W/AUTO DIFF WBC: CPT

## 2019-04-23 PROCEDURE — 36415 COLL VENOUS BLD VENIPUNCTURE: CPT

## 2019-04-23 PROCEDURE — 80053 COMPREHEN METABOLIC PANEL: CPT

## 2019-04-23 PROCEDURE — 80061 LIPID PANEL: CPT

## 2019-04-23 PROCEDURE — 82306 VITAMIN D 25 HYDROXY: CPT

## 2019-04-24 ENCOUNTER — OFFICE VISIT (OUTPATIENT)
Dept: CARDIOLOGY | Facility: CLINIC | Age: 79
End: 2019-04-24
Payer: COMMERCIAL

## 2019-04-24 VITALS
BODY MASS INDEX: 30.84 KG/M2 | HEIGHT: 59 IN | SYSTOLIC BLOOD PRESSURE: 149 MMHG | DIASTOLIC BLOOD PRESSURE: 67 MMHG | OXYGEN SATURATION: 98 % | WEIGHT: 153 LBS | HEART RATE: 52 BPM

## 2019-04-24 DIAGNOSIS — E78.2 MIXED HYPERLIPIDEMIA: ICD-10-CM

## 2019-04-24 DIAGNOSIS — Z01.818 PRE-OP EVALUATION: Primary | ICD-10-CM

## 2019-04-24 DIAGNOSIS — I10 ESSENTIAL HYPERTENSION: ICD-10-CM

## 2019-04-24 DIAGNOSIS — K21.00 GASTROESOPHAGEAL REFLUX DISEASE WITH ESOPHAGITIS: ICD-10-CM

## 2019-04-24 DIAGNOSIS — R01.1 CARDIAC MURMUR: ICD-10-CM

## 2019-04-24 PROCEDURE — 99999 PR PBB SHADOW E&M-EST. PATIENT-LVL IV: ICD-10-PCS | Mod: PBBFAC,,,

## 2019-04-24 PROCEDURE — 3077F PR MOST RECENT SYSTOLIC BLOOD PRESSURE >= 140 MM HG: ICD-10-PCS | Mod: CPTII,S$GLB,, | Performed by: INTERNAL MEDICINE

## 2019-04-24 PROCEDURE — 1101F PT FALLS ASSESS-DOCD LE1/YR: CPT | Mod: CPTII,S$GLB,, | Performed by: INTERNAL MEDICINE

## 2019-04-24 PROCEDURE — 3078F DIAST BP <80 MM HG: CPT | Mod: CPTII,S$GLB,, | Performed by: INTERNAL MEDICINE

## 2019-04-24 PROCEDURE — 3077F SYST BP >= 140 MM HG: CPT | Mod: CPTII,S$GLB,, | Performed by: INTERNAL MEDICINE

## 2019-04-24 PROCEDURE — 1101F PR PT FALLS ASSESS DOC 0-1 FALLS W/OUT INJ PAST YR: ICD-10-PCS | Mod: CPTII,S$GLB,, | Performed by: INTERNAL MEDICINE

## 2019-04-24 PROCEDURE — 3078F PR MOST RECENT DIASTOLIC BLOOD PRESSURE < 80 MM HG: ICD-10-PCS | Mod: CPTII,S$GLB,, | Performed by: INTERNAL MEDICINE

## 2019-04-24 PROCEDURE — 99214 PR OFFICE/OUTPT VISIT, EST, LEVL IV, 30-39 MIN: ICD-10-PCS | Mod: S$GLB,,, | Performed by: INTERNAL MEDICINE

## 2019-04-24 PROCEDURE — 99214 OFFICE O/P EST MOD 30 MIN: CPT | Mod: S$GLB,,, | Performed by: INTERNAL MEDICINE

## 2019-04-24 PROCEDURE — 99999 PR PBB SHADOW E&M-EST. PATIENT-LVL IV: CPT | Mod: PBBFAC,,,

## 2019-04-24 RX ORDER — IRBESARTAN AND HYDROCHLOROTHIAZIDE 300; 12.5 MG/1; MG/1
1 TABLET, FILM COATED ORAL DAILY
COMMUNITY

## 2019-04-24 NOTE — PROGRESS NOTES
Subjective:    Patient ID:  Isiah Allen is a 79 y.o. female who presents for follow-up of Essential hypertension and Pre-op Exam (endoscopy)    HPI     Ms. Allen is a 79 years old Turkish speaking female from [place] with benign essential hypertension, hypothyrodiism and GERD who is referred from internal medicine clinic Dr. Sinha for pre- op clearance for endoscopy.    Previously, patient was seen at the cardiology clinic on 07/12/18 for symptomatic bradycardia. She had presented with an episode of pre-syncope and lightheadedness and was found to have sinus bradycardia on EKG (07/13/18) with Vent rate 48 bpm and Qtc 373 msec. Work up included Echocardiogram (07/13/19) consistent with LVEF 60-65% w/ PA pressure 38 mmHg, grade II diastolic dysfunction and bi-atrial enlargement. No wall motion abnormalities were noted. Holter monitor was recommended and stress test conducted. However, the stress test was not completed due to fatigue at the heart rate 93 bpm, consistent with chronotropic incompetence and recommendation for pacemaker was made. Patient declined the pacemaker intervention.     She went to [facility], had followed up with her providers over there in the past one year including cardiologists and gastroenterologist. She denies hematochezia or melana, had an EGD done and was told she had ulcers. [place] had come back to [place] and communicated this to her PMD, who would like her to get a repeat endoscopy and would appreciate cardiology clearance prior to scheduling the procedure.    In the past one year, Patient denies fainting spells, lightheaded, chest pain, TENORIO, weakness or fatigue. Endorses being able to climb a set of stairs without getting short of breath. She is able to walk a block without getting dyspneic. Denies orthopnea, PND, or leg swelling. She endorses compliance with home medications which include following: Norvasc 5 mg QD, Vitamin D tablets, Synthroid 50 mcg QD, and Coapravel 300/25  "mg QD. She notes her blood pressure has been controlled and ranges 130-140/80-90 mmHg.     Past Medical History:   Diagnosis Date    Anxiety     Bell's palsy 2010    GERD (gastroesophageal reflux disease) 6/24/2013    Heart murmur     Hypertension     Mixed hyperlipidemia 6/24/2013    Thyroid disease      Past Surgical History:   Procedure Laterality Date    EGD (ESOPHAGOGASTRODUODENOSCOPY) N/A 6/19/2013    Performed by Leobardo Gax[facility]ola [facility] at [facility] (4TH FLR)    ESOPHAGEAL DILATION  6/18/13    HYSTERECTOMY  1980    dub    TONSILLECTOMY       Social History     Tobacco Use    Smoking status: Never Smoker    Smokeless tobacco: Never Used   Substance Use Topics    Alcohol use: Yes     Comment: socially     Family History   Problem Relation Age of Onset    Cancer Mother         breast    Cancer Son         skin, throat     Review of patient's allergies indicates:  No Known Allergies     Review of Systems   Constitution: Negative for diaphoresis, weight gain and weight loss.   Eyes: Negative for visual disturbance.   Cardiovascular: Negative for chest pain, claudication, dyspnea on exertion, irregular heartbeat, leg swelling, near-syncope, orthopnea, palpitations, paroxysmal nocturnal dyspnea and syncope.   Respiratory: Negative for cough and shortness of breath.    Hematologic/Lymphatic: Negative for adenopathy and bleeding problem. Does not bruise/bleed easily.   Gastrointestinal: Negative for heartburn, hematochezia and melena.   Genitourinary: Negative for frequency.   Neurological: Negative for focal weakness, loss of balance, paresthesias and weakness.        Objective:   BP (!) 149/67 (BP Location: Left arm, Patient Position: Sitting, BP Method: Large (Automatic))   Pulse (!) 52   Ht 4' 11" (1.499 m)   Wt 69.4 kg (153 lb)   SpO2 98%   BMI 30.90 kg/m² \     Physical Exam   Constitutional: She appears well-developed and well-nourished.   HENT:   Mouth/Throat: Oropharynx is clear and moist. " No oropharyngeal exudate.   Eyes: No scleral icterus.   Neck: Normal range of motion. Neck supple. No JVD present.   Cardiovascular: Normal rate, regular rhythm and intact distal pulses. Exam reveals no gallop and no friction rub.   Murmur (NAHEED Grade II/VI aortic region) heard.  Pulmonary/Chest: Effort normal and breath sounds normal. No respiratory distress. She has no wheezes. She has no rales. She exhibits no tenderness.   Abdominal: Soft. Bowel sounds are normal. She exhibits no distension. There is no tenderness.   Musculoskeletal: Normal range of motion. She exhibits no edema, tenderness or deformity.   Lymphadenopathy:     She has no cervical adenopathy.   Skin: Skin is warm. No rash noted. She is not diaphoretic. No erythema.     Treadmill Stress Test (07/13/19)  EKG Conclusions:    1. The EKG portion of this study is negative for ischemia at a low workload, and peak heart rate of 93 bpm (67% of predicted).   2. Sensitivity is impaired due to failure to reach target heart rate.   3. Exercise capacity is below average.   4. Blood pressure response to exercise was abnormal with an inappropriate rise in diastolic pressure (Presenting BP: 128/77 Peak BP: 148/101).   5. No significant arrhythmias were present.   6. There were no symptoms of chest discomfort or significant dyspnea throughout the protocol.   7. The Duke treadmill score was 4 suggesting an intermediate probability for future cardiovascular events.  Assessment:       1. Pre-op evaluation    2. Essential hypertension    3. Cardiac murmur    4. Mixed hyperlipidemia    5. Gastroesophageal reflux disease with esophagitis         Plan:        Isiah was seen today for essential hypertension and pre-op exam.    Diagnoses and all orders for this visit:    Pre-op evaluation  - 79 years old female with HFpEF EF 60% (07/19), hypertension, chronotropic competence presents for pre-op evaluation for endoscopy.     - At clinic HR noted to be 52 bpm. Denies  symptoms for the past one year: pre-syncope, lightheadedness, chest pain, TENORIO  - Denies history of ischemic heart disease, last echo in 07/18 with EF of 60%; no pitting edema; clear to ausculation lungs-  - No history of TIA/CVA  - Cr 1.1; GFR > 60; no h/o diabetes, not dependent on Insulin    ** Cardiovascular Risk Assessment:  Non-emergent procedure ( Endoscopy )  No active cardiac problems (such as unstable angina, decompensated heart failure, significant uncontrolled arrhythmias or severe valvular disease).  Procedure is low risk  Functional Status: able to ambulate without support/walker with mild discomfort; can climb stairs without discomfort.      Revised Cardiac Risk Index   1. History of ischemic heart disease   2. History of congestive heart failure   3. History of cerebrovascular disease (stroke or transient ischemic attack)   4. History of diabetes requiring preoperative insulin use   5. Chronic kidney disease (creatinine > 2 mg/dL)   6. Undergoing suprainguinal vascular, intraperitoneal, or intrathoracic surgery Risk for cardiac death, nonfatal myocardial infarction, and nonfatal cardiac arrest      0 predictors = 0.4%, 1 predictor = 0.9%, 2 predictors = 6.6%, ?3 predictors = >11%     RCRI Calculator Class and Risk percentage:   0 predictors = 0.4 %                       Anticoagulation:   Not on anticoagulation      Coronary Stenting: None  Preop cardiovascular exam     Surgical Risk Assessment   Active cardiac issues:     Active decompensated heart failure? No   Unstable angina?  No   Significant uncontrolled arrhythmias? No   Severe valvular heart disease-Aortic or Mitral Stenosis? No   Recent MI or coronary revascularization < 30 days? No      Cardiac Risk Factors  History of CAD/ischemic heart disease? No   History of cerebrovascular disease? No   History of compensated heart failure? No   Type 2 diabetes requiring insulin? No   Serum Creatinine > 2? No   Total cardiac risk factors 0        Recommendation:  Patient is low risk for the procedure - esophagogastroduodenoscopy.           Essential hypertension controlled, continue same.   -     Norvasc 5 mg daily  -     Coapravel 300 mg/25 mg daily    Cardiac murmur  - NAHEED grade II/VI, monitor for now. No symptoms.     Sinus Bradycardia:  HR 52 bpm without symptoms. Chronotropic incompetence based on treadmill test.   Risks and benefits of pacemaker discussed with the patient.  Patient currently follows up with cardiology service in [facility], and agrees with her cardiologist that she does not require a pacemaker.     Patient seen and examined with Dr. Monteiro. RTC as needed.     Donte Bui MD PGY II  Department of Cardiovascular Disease    [facility] [address]

## 2019-04-24 NOTE — PATIENT INSTRUCTIONS
You were seen at the Cardiology clinic for Pre-Op clearance for endoscopy. We will communicate with the provider.    Please continue taking medications as directed by the provider.    Near-Fainting: Uncertain Cause  Fainting (syncope) is a temporary loss of consciousness (passing out). This happens when blood flow to the brain is reduced. Near-fainting (near-syncope) is like fainting, but you do not fully pass out. Instead, you feel like you are going to pass out, but do not actually lose consciousness.  Signs and symptoms  The following are symptoms of near-fainting:  · Feeling lightheaded or like you are going to faint  · Weak pulse  · Nausea  · Sweating  · Blurred vision or feeling like your vision is fading  · Palpitations  · Chest pain  · Hard time breathing  · Feeling cool and clammy  Causes  This happens when your blood pressure suddenly drops, and not enough blood flows to your brain.  Common minor causes include:  · Sudden emotional stress such as fear, pain, panic, or the sight of blood  · Straining or overexertion, such as straining while using the toilet, coughing, or sneezing  · Standing up too quickly, or standing up for too long a time  More serious causes include:  · Very slow, fast, or irregular heart rate (arrhythmia)  · Dehydration  · Significant blood loss  · Medicines, or a recent change in medicines. Medicines that can cause fainting include blood pressure or heart medicines.  · Heart attack  · Heart valve problems  Remember, even minor causes can become serious if you fall and injure yourself, or are driving. You may need more tests. It is very important that you follow up with your doctor as advised.  Home care  The following guidelines will help you care for yourself at home:  · Rest today. Resume your normal activities as soon as you are feeling back to normal.  · If you become lightheaded or dizzy, lie down right away or sit with your head between your knees.  · Drink plenty of fluids  and don't skip meals.  Because the exact cause of your near fainting spell is not known, another spell could occur without warning. To stay safe, do not drive a car or use dangerous equipment. Do not take a bath alone. Use a shower instead. Do not swim alone. You can resume these activities when your healthcare provider says that you are no longer in danger of having a near-fainting spell.  Follow-up care  Follow up with your healthcare provider, or as advised.  Call 911  Call 911 if any of the following occur:  · Another near-fainting or full fainting spell occurs, and it is not explained by the common causes listed above  · Chest, arm, neck, jaw, back or abdominal pain  · Shortness of breath  · Weakness, tingling, or numbness in one side of the face, or in one arm or leg  · Slurred speech, confusion, trouble walking or seeing  · Seizure  When to seek medical advice  Call your healthcare provider right away if any of these occur:  · Changes in your medicines  · Occasional mild lightheadedness, especially when standing up too quickly or straining  Date Last Reviewed: 10/1/2016  © 8739-7110 [employer]. [address] [place] [address], [place] [address]. All rights reserved. This information is not intended as a substitute for professional medical care. Always follow your healthcare professional's instructions.

## 2019-04-26 ENCOUNTER — LAB VISIT (OUTPATIENT)
Dept: LAB | Facility: HOSPITAL | Age: 79
End: 2019-04-26
Attending: INTERNAL MEDICINE
Payer: COMMERCIAL

## 2019-04-26 DIAGNOSIS — K21.00 GASTROESOPHAGEAL REFLUX DISEASE WITH ESOPHAGITIS: ICD-10-CM

## 2019-04-26 DIAGNOSIS — K63.5 POLYP OF COLON, UNSPECIFIED PART OF COLON, UNSPECIFIED TYPE: ICD-10-CM

## 2019-04-26 DIAGNOSIS — K29.70 GASTRITIS, PRESENCE OF BLEEDING UNSPECIFIED, UNSPECIFIED CHRONICITY, UNSPECIFIED GASTRITIS TYPE: ICD-10-CM

## 2019-04-26 LAB — OB PNL STL: NEGATIVE

## 2019-04-26 PROCEDURE — 82272 OCCULT BLD FECES 1-3 TESTS: CPT

## 2019-04-26 PROCEDURE — 87338 HPYLORI STOOL AG IA: CPT

## 2019-04-30 ENCOUNTER — ANESTHESIA (OUTPATIENT)
Dept: ENDOSCOPY | Facility: HOSPITAL | Age: 79
End: 2019-04-30
Payer: COMMERCIAL

## 2019-04-30 ENCOUNTER — HOSPITAL ENCOUNTER (OUTPATIENT)
Facility: HOSPITAL | Age: 79
Discharge: HOME | End: 2019-04-30
Attending: INTERNAL MEDICINE | Admitting: INTERNAL MEDICINE
Payer: COMMERCIAL

## 2019-04-30 ENCOUNTER — ANESTHESIA EVENT (OUTPATIENT)
Dept: ENDOSCOPY | Facility: HOSPITAL | Age: 79
End: 2019-04-30
Payer: COMMERCIAL

## 2019-04-30 VITALS
TEMPERATURE: 98 F | OXYGEN SATURATION: 96 % | HEIGHT: 59 IN | SYSTOLIC BLOOD PRESSURE: 136 MMHG | DIASTOLIC BLOOD PRESSURE: 65 MMHG | WEIGHT: 151 LBS | BODY MASS INDEX: 30.44 KG/M2 | RESPIRATION RATE: 16 BRPM | HEART RATE: 48 BPM

## 2019-04-30 DIAGNOSIS — K21.00 GASTROESOPHAGEAL REFLUX DISEASE WITH ESOPHAGITIS: Primary | ICD-10-CM

## 2019-04-30 DIAGNOSIS — K21.9 GERD (GASTROESOPHAGEAL REFLUX DISEASE): ICD-10-CM

## 2019-04-30 PROCEDURE — 43239 EGD BIOPSY SINGLE/MULTIPLE: CPT | Mod: 59,,, | Performed by: INTERNAL MEDICINE

## 2019-04-30 PROCEDURE — C1726 CATH, BAL DIL, NON-VASCULAR: HCPCS | Performed by: INTERNAL MEDICINE

## 2019-04-30 PROCEDURE — 37000009 HC ANESTHESIA EA ADD 15 MINS: Performed by: INTERNAL MEDICINE

## 2019-04-30 PROCEDURE — E9220 PRA ENDO ANESTHESIA: ICD-10-PCS | Mod: ,,, | Performed by: NURSE ANESTHETIST, CERTIFIED REGISTERED

## 2019-04-30 PROCEDURE — 43249 ESOPH EGD DILATION <30 MM: CPT | Performed by: INTERNAL MEDICINE

## 2019-04-30 PROCEDURE — 43249 ESOPH EGD DILATION <30 MM: CPT | Mod: ,,, | Performed by: INTERNAL MEDICINE

## 2019-04-30 PROCEDURE — E9220 PRA ENDO ANESTHESIA: HCPCS | Mod: ,,, | Performed by: NURSE ANESTHETIST, CERTIFIED REGISTERED

## 2019-04-30 PROCEDURE — 25000003 PHARM REV CODE 250: Performed by: INTERNAL MEDICINE

## 2019-04-30 PROCEDURE — 27201012 HC FORCEPS, HOT/COLD, DISP: Performed by: INTERNAL MEDICINE

## 2019-04-30 PROCEDURE — 37000008 HC ANESTHESIA 1ST 15 MINUTES: Performed by: INTERNAL MEDICINE

## 2019-04-30 PROCEDURE — 88305 TISSUE SPECIMEN TO PATHOLOGY - SURGERY: ICD-10-PCS | Mod: 26,,, | Performed by: PATHOLOGY

## 2019-04-30 PROCEDURE — 88305 TISSUE EXAM BY PATHOLOGIST: CPT | Performed by: PATHOLOGY

## 2019-04-30 PROCEDURE — 63600175 PHARM REV CODE 636 W HCPCS: Performed by: NURSE ANESTHETIST, CERTIFIED REGISTERED

## 2019-04-30 PROCEDURE — 43239 EGD BIOPSY SINGLE/MULTIPLE: CPT | Performed by: INTERNAL MEDICINE

## 2019-04-30 PROCEDURE — 88305 TISSUE EXAM BY PATHOLOGIST: CPT | Mod: 26,,, | Performed by: PATHOLOGY

## 2019-04-30 PROCEDURE — 43239 PR EGD, FLEX, W/BIOPSY, SGL/MULTI: ICD-10-PCS | Mod: 59,,, | Performed by: INTERNAL MEDICINE

## 2019-04-30 PROCEDURE — 43249 PR EGD, FLEX, W/BALL DILATION, < 30MM: ICD-10-PCS | Mod: ,,, | Performed by: INTERNAL MEDICINE

## 2019-04-30 RX ORDER — LIDOCAINE HCL/PF 100 MG/5ML
SYRINGE (ML) INTRAVENOUS
Status: DISCONTINUED | OUTPATIENT
Start: 2019-04-30 | End: 2019-04-30

## 2019-04-30 RX ORDER — PROPOFOL 10 MG/ML
VIAL (ML) INTRAVENOUS
Status: DISCONTINUED | OUTPATIENT
Start: 2019-04-30 | End: 2019-04-30

## 2019-04-30 RX ORDER — SODIUM CHLORIDE 9 MG/ML
INJECTION, SOLUTION INTRAVENOUS CONTINUOUS
Status: DISCONTINUED | OUTPATIENT
Start: 2019-04-30 | End: 2019-04-30 | Stop reason: HOSPADM

## 2019-04-30 RX ORDER — PROPOFOL 10 MG/ML
VIAL (ML) INTRAVENOUS CONTINUOUS PRN
Status: DISCONTINUED | OUTPATIENT
Start: 2019-04-30 | End: 2019-04-30

## 2019-04-30 RX ADMIN — PROPOFOL 50 MG: 10 INJECTION, EMULSION INTRAVENOUS at 03:04

## 2019-04-30 RX ADMIN — LIDOCAINE HYDROCHLORIDE 50 MG: 20 INJECTION, SOLUTION INTRAVENOUS at 03:04

## 2019-04-30 RX ADMIN — PROPOFOL 30 MG: 10 INJECTION, EMULSION INTRAVENOUS at 03:04

## 2019-04-30 RX ADMIN — SODIUM CHLORIDE: 0.9 INJECTION, SOLUTION INTRAVENOUS at 02:04

## 2019-04-30 RX ADMIN — PROPOFOL 150 MCG/KG/MIN: 10 INJECTION, EMULSION INTRAVENOUS at 03:04

## 2019-04-30 RX ADMIN — SODIUM CHLORIDE: 0.9 INJECTION, SOLUTION INTRAVENOUS at 03:04

## 2019-04-30 NOTE — TRANSFER OF CARE
"Anesthesia Transfer of Care Note    Patient: Isiah Allen    Procedure(s) Performed: Procedure(s) (LRB):  ESOPHAGOGASTRODUODENOSCOPY (EGD) (N/A)    Patient location: GI    Anesthesia Type: general    Transport from OR: Transported from OR on room air with adequate spontaneous ventilation    Post pain: adequate analgesia    Post assessment: no apparent anesthetic complications and tolerated procedure well    Post vital signs: stable    Level of consciousness: awake    Nausea/Vomiting: no nausea/vomiting    Complications: none    Transfer of care protocol was followed      Last vitals:   Visit Vitals  BP (!) 121/58   Pulse (!) 57   Temp 36.6 °C (97.9 °F)   Resp 18   Ht 4' 11.06" (1.5 m)   Wt 68.5 kg (151 lb)   SpO2 95%   Breastfeeding? No   BMI 30.44 kg/m²     "

## 2019-04-30 NOTE — [employer] PATIENT INSTRUCTIONS
Discharge Summary/Instructions after an Endoscopic Procedure  Patient Name: Isiah Allen  Patient MRN: [id number]  Patient YOB: 1940 Tuesday, April 30, 2019  Jose Santiago MD  RESTRICTIONS:  During your procedure today, you received medications for sedation.  These   medications may affect your judgment, balance and coordination.  Therefore,   for 24 hours, you have the following restrictions:   - DO NOT drive a car, operate machinery, make legal/financial decisions,   sign important papers or drink alcohol.    ACTIVITY:  Today: no heavy lifting, straining or running due to procedural   sedation/anesthesia.  The following day: return to full activity including work.  DIET:  Eat and drink normally unless instructed otherwise.     TREATMENT FOR COMMON SIDE EFFECTS:  - Mild abdominal pain, nausea, belching, bloating or excessive gas:  rest,   eat lightly and use a heating pad.  - Sore Throat: treat with throat lozenges and/or gargle with warm salt   water.  - Because air was used during the procedure, expelling large amounts of air   from your rectum or belching is normal.  - If a bowel prep was taken, you may not have a bowel movement for 1-3 days.    This is normal.  SYMPTOMS TO WATCH FOR AND REPORT TO YOUR PHYSICIAN:  1. Abdominal pain or bloating, other than gas cramps.  2. Chest pain.  3. Back pain.  4. Signs of infection such as: chills or fever occurring within 24 hours   after the procedure.  5. Rectal bleeding, which would show as bright red, maroon, or black stools.   (A tablespoon of blood from the rectum is not serious, especially if   hemorrhoids are present.)  6. Vomiting.  7. Weakness or dizziness.  GO DIRECTLY TO THE NEAREST EMERGENCY ROOM IF YOU HAVE ANY OF THE FOLLOWING:      Difficulty breathing              Chills and/or fever over 101 F   Persistent vomiting and/or vomiting blood   Severe abdominal pain   Severe chest pain   Black, tarry stools   Bleeding- more than one tablespoon   Any  other symptom or condition that you feel may need urgent attention  Your doctor recommends these additional instructions:  If any biopsies were taken, your doctors clinic will contact you in 1 to 2   weeks with any results.  - Patient has a contact number available for emergencies.  The signs and   symptoms of potential delayed complications were discussed with the   patient.  Return to normal activities tomorrow.  Written discharge   instructions were provided to the patient.   - Discharge patient to home.   - Await pathology results.   - Use a proton pump inhibitor PO daily indefinitely.   - The findings and recommendations were discussed with the designated   responsible adult.   For questions, problems or results please call your physician - Jose Santiago MD at Work:  [phone].  [facility], EMERGENCY ROOM PHONE NUMBER: [phone]  IF A COMPLICATION OR EMERGENCY SITUATION ARISES AND YOU ARE UNABLE TO REACH   YOUR PHYSICIAN - GO DIRECTLY TO THE EMERGENCY ROOM.  Jose Santiago MD  4/30/2019 3:58:33 PM  This report has been verified and signed electronically.  [employer]

## 2019-04-30 NOTE — ANESTHESIA PREPROCEDURE EVALUATION
04/30/2019  Isiah Allen is a 79 y.o., female.    Past Medical History:   Diagnosis Date    Anxiety     Bell's palsy 2010    GERD (gastroesophageal reflux disease) 6/24/2013    Heart murmur     Hypertension     Mixed hyperlipidemia 6/24/2013    Thyroid disease      Past Surgical History:   Procedure Laterality Date    EGD (ESOPHAGOGASTRODUODENOSCOPY) N/A 6/19/2013    Performed by [facility] at [facility] ENDO (4TH FLR)    ESOPHAGEAL DILATION  6/18/13    HYSTERECTOMY  1980    dub    TONSILLECTOMY       Patient Active Problem List   Diagnosis    Back pain    Cardiac murmur    HTN (hypertension)    Unspecified hypothyroidism    Vitamin D imbalance    Thrombocytosis    GERD (gastroesophageal reflux disease)    Dizziness    Pre-op evaluation         Anesthesia Evaluation    I have reviewed the Patient Summary Reports.     I have reviewed the Medications.     Review of Systems  Anesthesia Hx:  No problems with previous Anesthesia  History of prior surgery of interest to airway management or planning: Denies Family Hx of Anesthesia complications.   Denies Personal Hx of Anesthesia complications.   Social:  Non-Smoker    Hematology/Oncology:  Hematology Normal   Oncology Normal     EENT/Dental:EENT/Dental Normal   Cardiovascular:   Hypertension Denies MI.  Denies CAD.           Pulmonary:  Pulmonary Normal    Renal/:  Renal/ Normal     Hepatic/GI:   GERD, well controlled Abdominal pain, denies N/V   Musculoskeletal:  Musculoskeletal Normal    Neurological:   TIA, Neuromuscular Disease,    Endocrine:   Hypothyroidism    Dermatological:  Skin Normal    Psych:  Psychiatric Normal           Physical Exam  General:  Well nourished    Airway/Jaw/Neck:  Airway Findings: Mouth Opening: Small, but > 3cm Tongue: Normal  General Airway Assessment: Adult  Mallampati: III  TM Distance: Normal, at  least 6 cm  Jaw/Neck Findings:  Neck ROM: Normal ROM     Eyes/Ears/Nose:  Eyes/Ears/Nose Findings:    Dental:  Dental Findings: In tact   Chest/Lungs:  Chest/Lungs Clear    Heart/Vascular:  Heart Findings: Normal Heart murmur: negative Vascular Findings: Normal    Abdomen:  Abdomen Findings: Normal    Musculoskeletal:  Musculoskeletal Findings: Normal   Skin:  Skin Findings: Normal    Mental Status:  Mental Status Findings: Normal        Anesthesia Plan  Type of Anesthesia, risks & benefits discussed:  Anesthesia Type:  general  Patient's Preference:   Intra-op Monitoring Plan: standard ASA monitors  Intra-op Monitoring Plan Comments:   Post Op Pain Control Plan:   Post Op Pain Control Plan Comments:   Induction:   IV  Beta Blocker:  Patient is not currently on a Beta-Blocker (No further documentation required).       Informed Consent: Patient understands risks and agrees with Anesthesia plan.  Questions answered. Anesthesia consent signed with patient.  ASA Score: 3     Day of Surgery Review of History & Physical:    H&P update referred to the surgeon.         Ready For Surgery From Anesthesia Perspective.

## 2019-04-30 NOTE — DISCHARGE INSTRUCTIONS

## 2019-05-01 ENCOUNTER — OFFICE VISIT (OUTPATIENT)
Dept: INTERNAL MEDICINE | Facility: CLINIC | Age: 79
End: 2019-05-01
Payer: COMMERCIAL

## 2019-05-01 DIAGNOSIS — E03.9 HYPOTHYROIDISM, UNSPECIFIED TYPE: ICD-10-CM

## 2019-05-01 DIAGNOSIS — Z71.89 ENCOUNTER FOR MEDICATION REVIEW AND COUNSELING: Primary | ICD-10-CM

## 2019-05-01 DIAGNOSIS — D36.9 ADENOMATOUS POLYPS: ICD-10-CM

## 2019-05-01 DIAGNOSIS — K22.2 SCHATZKI'S RING: ICD-10-CM

## 2019-05-01 DIAGNOSIS — K21.9 GASTROESOPHAGEAL REFLUX DISEASE, ESOPHAGITIS PRESENCE NOT SPECIFIED: ICD-10-CM

## 2019-05-01 LAB — H PYLORI AG STL QL IA: NOT DETECTED

## 2019-05-01 PROCEDURE — 1101F PT FALLS ASSESS-DOCD LE1/YR: CPT | Mod: CPTII,S$GLB,, | Performed by: INTERNAL MEDICINE

## 2019-05-01 PROCEDURE — 99213 PR OFFICE/OUTPT VISIT, EST, LEVL III, 20-29 MIN: ICD-10-PCS | Mod: S$GLB,,, | Performed by: INTERNAL MEDICINE

## 2019-05-01 PROCEDURE — 1101F PR PT FALLS ASSESS DOC 0-1 FALLS W/OUT INJ PAST YR: ICD-10-PCS | Mod: CPTII,S$GLB,, | Performed by: INTERNAL MEDICINE

## 2019-05-01 PROCEDURE — 99999 PR PBB SHADOW E&M-EST. PATIENT-LVL II: CPT | Mod: PBBFAC,,, | Performed by: INTERNAL MEDICINE

## 2019-05-01 PROCEDURE — 99999 PR PBB SHADOW E&M-EST. PATIENT-LVL II: ICD-10-PCS | Mod: PBBFAC,,, | Performed by: INTERNAL MEDICINE

## 2019-05-01 PROCEDURE — 99213 OFFICE O/P EST LOW 20 MIN: CPT | Mod: S$GLB,,, | Performed by: INTERNAL MEDICINE

## 2019-05-01 RX ORDER — OMEPRAZOLE 20 MG/1
20 CAPSULE, DELAYED RELEASE ORAL DAILY
Qty: 90 CAPSULE | Refills: 2 | Status: SHIPPED | OUTPATIENT
Start: 2019-05-01 | End: 2020-04-30

## 2019-05-01 RX ORDER — LEVOTHYROXINE SODIUM 75 UG/1
75 TABLET ORAL DAILY
Qty: 90 TABLET | Refills: 2 | Status: SHIPPED | OUTPATIENT
Start: 2019-05-01 | End: 2020-04-30

## 2019-05-03 NOTE — PROGRESS NOTES
Miss Allen is here today for her review. I gave her copies of her studies and discussed them in detail including blood work that showed mild increased platelet count at 416. Cholesterol was increased at 239. She admitted to dietary indiscretions lately and I encouraged her to get back to her usual diet as before. I recommend she repeat lipids in 3 months for comparison. TSH level as slightly increased to 4.272 with free T4 at 0.92 down from last years result. I increased her dose of Synthroid to 75 mcgs daily and repeat TSH in 3 months for comparison. All other parameters were unremarkable. Additional studies included EGD that showed a moderate Schatzki ring which was dilated. There was evidence of gastropathy and biopsies were obtained in these sites. These are pending at this writing and I will communicate the results once available. She is to take PPI's indefinitely and a refill of her omeprazole was provided. I will repeat EGD and colonoscopy in 1 year for comparison purposes. Colonoscopy done in [place] recently showed a large, pedunculated polyp with ominous features. I contacted her physician [place] country who graciously sent me the pathology report of this lesion. This showed tubular adenoma with metaplasia mentioned in the report. No dysplasia was reported. I recommended repeat colonoscopy in 1 year for follow up.   She will continue with her other medications as before and RTC 1 year or sooner if needed.

## 2019-05-06 ENCOUNTER — PATIENT MESSAGE (OUTPATIENT)
Dept: GASTROENTEROLOGY | Facility: CLINIC | Age: 79
End: 2019-05-06

## 2019-05-06 NOTE — TELEPHONE ENCOUNTER
[facility]

## 2019-05-06 NOTE — ANESTHESIA RELEASE NOTE
"Anesthesia Release from PACU Note    Patient: Isiah Allen    Procedure(s) Performed: Procedure(s) (LRB):  ESOPHAGOGASTRODUODENOSCOPY (EGD) (N/A)    Anesthesia type: general    Post pain: Adequate analgesia    Post assessment: no apparent anesthetic complications and tolerated procedure well    Last Vitals:   Visit Vitals  /65   Pulse (!) 48   Temp 36.6 °C (97.9 °F)   Resp 16   Ht 4' 11.06" (1.5 m)   Wt 68.5 kg (151 lb)   SpO2 96%   Breastfeeding? No   BMI 30.44 kg/m²       Post vital signs: stable    Level of consciousness: awake and alert     Nausea/Vomiting: no nausea/no vomiting    Complications: none    Airway Patency: patent    Respiratory: unassisted, spontaneous ventilation, room air    Cardiovascular: stable and blood pressure at baseline    Hydration: euvolemic  "

## 2019-05-06 NOTE — ANESTHESIA POSTPROCEDURE EVALUATION
Anesthesia Post Evaluation    Patient: Isiah Allen    Procedure(s) Performed: Procedure(s) (LRB):  ESOPHAGOGASTRODUODENOSCOPY (EGD) (N/A)    Final Anesthesia Type: general  Patient location during evaluation: GI PACU  Patient participation: Yes- Able to Participate  Level of consciousness: awake and alert  Post-procedure vital signs: reviewed and stable  Pain management: adequate  Airway patency: patent  PONV status at discharge: No PONV  Anesthetic complications: no      Cardiovascular status: hemodynamically stable  Respiratory status: unassisted, spontaneous ventilation and room air  Hydration status: euvolemic  Follow-up not needed.            Event Time     Out of Recovery 16:43:09          Pain/Yajaira Score: No data recorded

## 2022-10-06 ENCOUNTER — PATIENT MESSAGE (OUTPATIENT)
Dept: INTERNAL MEDICINE | Facility: CLINIC | Age: 82
End: 2022-10-06
Payer: COMMERCIAL

## 2022-10-24 ENCOUNTER — PATIENT MESSAGE (OUTPATIENT)
Dept: INTERNAL MEDICINE | Facility: CLINIC | Age: 82
End: 2022-10-24
Payer: COMMERCIAL